# Patient Record
Sex: MALE | Race: BLACK OR AFRICAN AMERICAN | NOT HISPANIC OR LATINO | Employment: OTHER | URBAN - NONMETROPOLITAN AREA
[De-identification: names, ages, dates, MRNs, and addresses within clinical notes are randomized per-mention and may not be internally consistent; named-entity substitution may affect disease eponyms.]

---

## 2022-08-19 ENCOUNTER — HOSPITAL ENCOUNTER (EMERGENCY)
Facility: HOSPITAL | Age: 32
Discharge: HOME | End: 2022-08-19
Admitting: STUDENT IN AN ORGANIZED HEALTH CARE EDUCATION/TRAINING PROGRAM

## 2022-08-19 VITALS
HEART RATE: 74 BPM | TEMPERATURE: 98.1 F | SYSTOLIC BLOOD PRESSURE: 124 MMHG | WEIGHT: 142 LBS | OXYGEN SATURATION: 100 % | RESPIRATION RATE: 14 BRPM | DIASTOLIC BLOOD PRESSURE: 78 MMHG | BODY MASS INDEX: 26.13 KG/M2 | HEIGHT: 62 IN

## 2022-08-19 DIAGNOSIS — Z76.89 ENCOUNTER FOR ASSESSMENT OF STD EXPOSURE: ICD-10-CM

## 2022-08-19 DIAGNOSIS — R30.0 DYSURIA: Primary | ICD-10-CM

## 2022-08-19 LAB
BILIRUB UR QL STRIP: NEGATIVE
CLARITY UR: CLEAR
COLOR UR: YELLOW
GLUCOSE UR STRIP-MCNC: NEGATIVE MG/DL
HGB UR QL STRIP.AUTO: NEGATIVE
KETONES UR QL STRIP: NEGATIVE
LEUKOCYTE ESTERASE UR QL STRIP.AUTO: NEGATIVE
NITRITE UR QL STRIP: NEGATIVE
PH UR STRIP.AUTO: 8.5 [PH] (ref 5–8)
PROT UR QL STRIP: NEGATIVE
SP GR UR STRIP: 1.02 (ref 1–1.03)
UROBILINOGEN UR QL STRIP: ABNORMAL

## 2022-08-19 PROCEDURE — 81003 URINALYSIS AUTO W/O SCOPE: CPT | Performed by: PHYSICIAN ASSISTANT

## 2022-08-19 PROCEDURE — 96372 THER/PROPH/DIAG INJ SC/IM: CPT

## 2022-08-19 PROCEDURE — 87491 CHLMYD TRACH DNA AMP PROBE: CPT | Performed by: PHYSICIAN ASSISTANT

## 2022-08-19 PROCEDURE — 99283 EMERGENCY DEPT VISIT LOW MDM: CPT

## 2022-08-19 PROCEDURE — 87591 N.GONORRHOEAE DNA AMP PROB: CPT | Performed by: PHYSICIAN ASSISTANT

## 2022-08-19 PROCEDURE — 25010000002 CEFTRIAXONE PER 250 MG: Performed by: PHYSICIAN ASSISTANT

## 2022-08-19 RX ORDER — AZITHROMYCIN 250 MG/1
1000 TABLET, FILM COATED ORAL ONCE
Status: COMPLETED | OUTPATIENT
Start: 2022-08-19 | End: 2022-08-19

## 2022-08-19 RX ORDER — PHENAZOPYRIDINE HYDROCHLORIDE 200 MG/1
200 TABLET, FILM COATED ORAL ONCE
Status: COMPLETED | OUTPATIENT
Start: 2022-08-19 | End: 2022-08-19

## 2022-08-19 RX ORDER — PHENAZOPYRIDINE HYDROCHLORIDE 100 MG/1
100 TABLET, FILM COATED ORAL 3 TIMES DAILY PRN
Qty: 12 TABLET | Refills: 0 | Status: SHIPPED | OUTPATIENT
Start: 2022-08-19

## 2022-08-19 RX ADMIN — AZITHROMYCIN 1000 MG: 250 TABLET, FILM COATED ORAL at 15:27

## 2022-08-19 RX ADMIN — PHENAZOPYRIDINE HYDROCHLORIDE 200 MG: 200 TABLET ORAL at 15:28

## 2022-08-19 RX ADMIN — LIDOCAINE HYDROCHLORIDE 1 G: 10 INJECTION, SOLUTION EPIDURAL; INFILTRATION; INTRACAUDAL; PERINEURAL at 15:28

## 2022-08-19 NOTE — DISCHARGE INSTRUCTIONS
Today you were tested for gonorrhea and chlamydia.  If you test positive your results will come back in the next couple days and you will be contacted.  Even if you test positive you have completed your treatment however it is important that you remain abstinent until being notified.  Please follow-up with your primary care provider, see the list below for local providers, or follow-up with the health department for routine STD testing, further discussion of HPV, and routine sexual health monitoring.  Should you develop any new or worsening symptoms please return to the ER for further evaluation.  Otherwise please follow-up with your primary care provider or health department within the next 48 hours.    Follow up with one of the [facility] physician groups below to setup primary care. If you have trouble making an appointment, please call the [facility] Nurse Line at [phone]    [facility] Primary Care - [address]  [address] [phone]    [facility] Internal Medicine - [address] [address], [address], [place] [address] [phone]    [facility] Family & Internal Medicine - [address] [address], [address], [place] [address] [phone]     [facility] Primary Care ([facility]) - [place]  [address] [place] [address], [address], [place] [address] [phone]    [facility] Primary Care - [address], [address] [phone]    [facility] Family Medicine - [address] [address], [place] [address] [phone]    [facility] - [place]  [address], [place], [address] [phone]    [facility] [address], [address] [phone]    [facility] Primary Care - [address] [address] [phone]    [facility] - [address], [facility], [place], [place], [address] [phone]

## 2022-08-19 NOTE — ED PROVIDER NOTES
"Subjective   History of Present Illness    Patient is an otherwise healthy 31-year-old male presenting to ED with penile discomfort and concern for STDs.  Patient states approximately month ago he noticed a rash that broke out on the external part of his groin region.  Patient states that it self resolved without any treatment he had no other symptoms until 2 weeks ago when he started having \"a briefly cool feeling\" with urination.  Patient denies any significant pain or burning but reports that it has been uncomfortable.  Patient followed up with an herbalist specialist who gave him a tea to drink however he feels that \"maybe all the toxins are being released and that is what is worsening it.\"  Patient denies any noted external lesions, penile discharge, hematuria, abdominal pain, flank pain, testicular pain, scrotal swelling, fevers, chills, diaphoresis, oral lesions.  Patient denies any known STD contact however he is concerned he may have been exposed.  Patient states \"the more research I did now I may have HPV or something.\"  Patient has used no treatment outside of the herbal tea and presents at this time for further evaluation.    Records reviewed show no previous ED, outpatient, or inpatient visits.    Review of Systems   Constitutional: Negative.  Negative for diaphoresis, fatigue, fever and unexpected weight change.   HENT: Negative.  Negative for sore throat.    Eyes: Negative.    Respiratory: Negative.    Cardiovascular: Negative.    Gastrointestinal: Negative.  Negative for abdominal pain, nausea and vomiting.   Genitourinary: Positive for dysuria and penile pain. Negative for decreased urine volume, difficulty urinating, flank pain, frequency, genital sores, hematuria, penile discharge, penile swelling, scrotal swelling, testicular pain and urgency.   Musculoskeletal: Negative.  Negative for arthralgias and myalgias.   Skin: Negative.    Neurological: Negative.    Psychiatric/Behavioral: Negative.  "   All other systems reviewed and are negative.      History reviewed. No pertinent past medical history.    No Known Allergies    History reviewed. No pertinent surgical history.    History reviewed. No pertinent family history.    Social History     Socioeconomic History   • Marital status: Single   Tobacco Use   • Smoking status: Never Smoker   • Smokeless tobacco: Never Used   Substance and Sexual Activity   • Alcohol use: Yes     Comment: SOCIALLY   • Drug use: Yes     Types: Marijuana           Objective   Physical Exam  Vitals and nursing note reviewed. Exam conducted with a chaperone present (Chaperone present for entire examination, Mikhail BROWN).   Constitutional:       General: He is not in acute distress.     Appearance: Normal appearance. He is well-developed, well-groomed and normal weight. He is not ill-appearing, toxic-appearing or diaphoretic.   HENT:      Head: Normocephalic.      Mouth/Throat:      Mouth: Mucous membranes are moist.      Pharynx: Oropharynx is clear. No oropharyngeal exudate or posterior oropharyngeal erythema.      Comments: No intraoral lesions  Eyes:      General: No scleral icterus.     Conjunctiva/sclera: Conjunctivae normal.      Pupils: Pupils are equal, round, and reactive to light.   Cardiovascular:      Rate and Rhythm: Normal rate.   Pulmonary:      Effort: Pulmonary effort is normal.      Breath sounds: Normal breath sounds.   Abdominal:      General: Bowel sounds are normal.      Palpations: Abdomen is soft.      Tenderness: There is no abdominal tenderness. There is no right CVA tenderness or left CVA tenderness.   Genitourinary:     Pubic Area: No rash.       Penis: Normal and circumcised. No erythema, tenderness, discharge, swelling or lesions.       Testes: Normal.         Right: Mass, tenderness or swelling not present.         Left: Mass, tenderness or swelling not present.   Musculoskeletal:         General: Normal range of motion.      Cervical back: Normal  "range of motion and neck supple.   Lymphadenopathy:      Lower Body: No right inguinal adenopathy. No left inguinal adenopathy.   Skin:     General: Skin is warm and dry.      Coloration: Skin is not jaundiced or pale.      Findings: No lesion or rash.   Neurological:      Mental Status: He is alert and oriented to person, place, and time.      Gait: Gait normal.   Psychiatric:         Attention and Perception: Attention normal.         Mood and Affect: Mood is anxious.         Speech: Speech normal.         Behavior: Behavior normal. Behavior is cooperative.         Procedures           ED Course                                           MDM  Number of Diagnoses or Management Options     Amount and/or Complexity of Data Reviewed  Clinical lab tests: ordered and reviewed  Tests in the medicine section of CPT®: reviewed and ordered  Decide to obtain previous medical records or to obtain history from someone other than the patient: yes  Review and summarize past medical records: yes    Patient Progress  Patient progress: improved        Patient is an otherwise healthy 31-year-old male presenting to ED with penile discomfort and concern for STDs.  Urinalysis unremarkable with no evidence of infection.  Gonorrhea and Chlamydia testing was obtained.  Offered patient empiric treatment for STDs for which she wishes to proceed.  Patient was given 1 g IM Rocephin as well as oral Zithromax.  Patient given Pyridium and reported decreased \"breeziness\" with urination.  Vital signs remained stable.  Discussed with patient importance of following up with primary care provider or the health department for routine monitoring and evaluation of STDs.  Discussed need to follow-up with 1 of these providers at the PCP or health department for further discussion of HPV.  Advised that should patient test positive for gonorrhea and chlamydia he will be tested with the results and importance of remaining abstinent until that time.  Discussed " that should he be positive he has completed his treatment.  Discussed safe sexual practices.  Advised patient of strict return precautions and need for immediate return to ED should he develop any new or worsening symptoms.  Patient appreciative with no further questions, concerns, needs at this time and is stable for discharge.    Final diagnoses:   Dysuria   Encounter for assessment of STD exposure       ED Disposition  ED Disposition     ED Disposition   Discharge    Condition   Stable    Comment   --             Provider, No Known  [facility]  [facility] [address]  [phone]    Schedule an appointment as soon as possible for a visit in 2 day(s)      [facility] [place]  [address] [facility] [address] [phone]  Schedule an appointment as soon as possible for a visit in 2 day(s)      [facility]  [address] [place] [address] [phone]  Follow up  As needed         Medication List      New Prescriptions    phenazopyridine 100 MG tablet  Commonly known as: PYRIDIUM  Take 1 tablet by mouth 3 (Three) Times a Day As Needed for Bladder Spasms.           Where to Get Your Medications      These medications were sent to [id number] - [place], [place] - [address] AT [facility] [address] - [phone]  - [phone]   [address] [address]    Phone: [phone]   · phenazopyridine 100 MG tablet          Joe Lau PA-C  08/20/22 0016

## 2022-08-22 LAB
C TRACH RRNA CVX QL NAA+PROBE: NOT DETECTED
N GONORRHOEA RRNA SPEC QL NAA+PROBE: NOT DETECTED